# Patient Record
Sex: MALE | Race: WHITE
[De-identification: names, ages, dates, MRNs, and addresses within clinical notes are randomized per-mention and may not be internally consistent; named-entity substitution may affect disease eponyms.]

---

## 2018-03-19 ENCOUNTER — HOSPITAL ENCOUNTER (OUTPATIENT)
Dept: HOSPITAL 31 - C.SDS | Age: 78
Discharge: HOME | End: 2018-03-19
Attending: SURGERY
Payer: MEDICARE

## 2018-03-19 VITALS — TEMPERATURE: 98.3 F

## 2018-03-19 VITALS — BODY MASS INDEX: 39.2 KG/M2

## 2018-03-19 VITALS — SYSTOLIC BLOOD PRESSURE: 151 MMHG | DIASTOLIC BLOOD PRESSURE: 67 MMHG | HEART RATE: 50 BPM | RESPIRATION RATE: 15 BRPM

## 2018-03-19 VITALS — OXYGEN SATURATION: 100 %

## 2018-03-19 DIAGNOSIS — K40.90: Primary | ICD-10-CM

## 2018-03-19 DIAGNOSIS — D17.6: ICD-10-CM

## 2018-03-19 PROCEDURE — 55520 REMOVAL OF SPERM CORD LESION: CPT

## 2018-03-19 PROCEDURE — 49505 PRP I/HERN INIT REDUC >5 YR: CPT

## 2018-03-19 PROCEDURE — 88304 TISSUE EXAM BY PATHOLOGIST: CPT

## 2018-03-19 RX ADMIN — HYDROMORPHONE HYDROCHLORIDE PRN MG: 1 INJECTION, SOLUTION INTRAMUSCULAR; INTRAVENOUS; SUBCUTANEOUS at 12:05

## 2018-03-19 RX ADMIN — HYDROMORPHONE HYDROCHLORIDE PRN MG: 1 INJECTION, SOLUTION INTRAMUSCULAR; INTRAVENOUS; SUBCUTANEOUS at 11:10

## 2018-03-19 NOTE — [id number].[id number]
Surgeon's Initial Post Op Note





- Surgeon's Notes


Surgeon: Dr Gallo


Assistant: Dr Fuller PGY3, Dr Menard PGY1


Type of Anesthesia: General Endo


Pre-Operative Diagnosis: right inguinal hernia


Operative Findings: see report


Post-Operative Diagnosis: as above


Operation Performed: right inguinal hernia repair with mesh


Specimen/Specimens Removed: cord lipoma


Estimated Blood Loss: EBL {In ML}: 15


Blood Products Given: N/A


Drains Used: No Drains


Post-Op Condition: Good


Date of Surgery/Procedure: 03/19/18


Time of Surgery/Procedure: 10:34

## 2018-03-19 NOTE — OP
PROCEDURE DATE:  03/19/2018



PREOPERATIVE DIAGNOSIS:  Right inguinal hernia.



POSTOPERATIVE DIAGNOSIS:  Repair of chronically incarcerated right inguinal

hernia.



SURGEON:  Connor Gallo Jr., MD



ASSISTANT:  Dr. Sailaja Bennett.



ANESTHESIOLOGIST:  Dr. Carter.



INDICATIONS:  The patient is an overweight male with a large right inguinal

hernia.  Preoperatively, I thought it could be reduced, but

intraoperatively it was extremely difficult to reduce this chronically

incarcerated hernia.  This was an indirect hernia.  There was a large

defect on the floor, so it was both an indirect and a direct hernia because

of the destruction of the floor of the canal.  An extralarge PHS Prolene

Hernia System was used for repair.



DESCRIPTION OF PROCEDURE:  The patient was given general anesthesia,

intravenous antibiotics were applied.  Venodyne boots were applied.  A

standard groin incision was made, which was longer than usual because of

the patient's obesity.  We dissected down to the external oblique,

dissected this free, dissected the _____ contents, eventually, dissected

out the sac, inverted the sac and put it back into the peritoneal cavity,

deployed the PHS Prolene Hernia System in the appropriate position and

after this has been done, we then secured the external portion of the mesh

as well as possible and this was very well secured.  After this was done,

we closed the external oblique and closed the skin in subcuticular closure.

Blood loss during the procedure was less than 25 mL and operation carried

out was repair of large chronically incarcerated right inguinal hernia.





__________________________________________

Connor Gallo Jr., MD



cc:  Donnell Dixon



DD:  03/19/2018 10:44:36

DT:  03/19/2018 11:15:13

Job # [id number]